# Patient Record
Sex: MALE | Race: BLACK OR AFRICAN AMERICAN | Employment: OTHER | URBAN - METROPOLITAN AREA
[De-identification: names, ages, dates, MRNs, and addresses within clinical notes are randomized per-mention and may not be internally consistent; named-entity substitution may affect disease eponyms.]

---

## 2017-05-04 ENCOUNTER — OFFICE VISIT (OUTPATIENT)
Dept: HEMATOLOGY | Age: 57
End: 2017-05-04

## 2017-05-04 ENCOUNTER — HOSPITAL ENCOUNTER (OUTPATIENT)
Dept: LAB | Age: 57
Discharge: HOME | End: 2017-05-04

## 2017-05-04 VITALS
WEIGHT: 193 LBS | HEIGHT: 74 IN | SYSTOLIC BLOOD PRESSURE: 127 MMHG | BODY MASS INDEX: 24.77 KG/M2 | DIASTOLIC BLOOD PRESSURE: 78 MMHG | HEART RATE: 66 BPM | OXYGEN SATURATION: 98 % | RESPIRATION RATE: 16 BRPM | TEMPERATURE: 97 F

## 2017-05-04 DIAGNOSIS — B18.2 CHRONIC HEPATITIS C WITHOUT HEPATIC COMA (HCC): Primary | ICD-10-CM

## 2017-05-04 DIAGNOSIS — B18.2 CHRONIC HEPATITIS C WITHOUT HEPATIC COMA (HCC): ICD-10-CM

## 2017-05-04 PROCEDURE — 99001 SPECIMEN HANDLING PT-LAB: CPT | Performed by: NURSE PRACTITIONER

## 2017-05-04 RX ORDER — LEDIPASVIR AND SOFOSBUVIR 90; 400 MG/1; MG/1
1 TABLET, FILM COATED ORAL DAILY
Qty: 28 TAB | Refills: 1 | Status: SHIPPED | OUTPATIENT
Start: 2017-05-04 | End: 2017-06-29

## 2017-05-04 NOTE — MR AVS SNAPSHOT
Visit Information Date & Time Provider Department Dept. Phone Encounter #  
 5/4/2017 11:00 AM Rachid Sin NP [facility] of [address] [place] [address] [id number] Follow-up Instructions Return in about 6 months (around 11/4/2017). Your Appointments 5/22/2017 10:30 AM  
Follow Up with Rachid Sin NP [facility] of [facility] Perez ([address]) Appt Note: follow up: [address] [facility] [place] [address] [place] [address] [facility] Upcoming Health Maintenance Date Due Pneumococcal 19-64 Medium Risk (1 of 1 - PPSV23) 8/8/1977 DTaP/Tdap/Td series (1 - Tdap) 8/8/1979 FOBT Q 1 YEAR AGE 50-75 8/8/2008 INFLUENZA AGE 9 TO ADULT 8/1/2017 Allergies as of 5/4/2017  Review Complete On: 5/4/2017 By: Giovanni Pearce No Known Allergies Current Immunizations  Never Reviewed No immunizations on file. Not reviewed this visit You Were Diagnosed With   
  
 Codes Comments Chronic hepatitis C without hepatic coma (HCC)    -  Primary ICD-10-CM: B18.2 ICD-9-CM: 070.54 Vitals BP Pulse Temp Resp Height(growth percentile) Weight(growth percentile) 127/78 (BP 1 Location: Left arm, BP Patient Position: Sitting) 66 97 °F (36.1 °C) (Tympanic) 16 6' 2\" (1.88 m) 193 lb (87.5 kg) SpO2 BMI Smoking Status 98% 24.78 kg/m2 Never Smoker Vitals History BMI and BSA Data Body Mass Index Body Surface Area 24.78 kg/m 2 2.14 m 2 Preferred Pharmacy Pharmacy Name Phone [facility] @ [address] [address] Celsa Ortiz [phone] Your Updated Medication List  
  
   
This list is accurate as of: 5/4/17 11:42 AM.  Always use your most recent med list. amLODIPine 10 mg tablet Commonly known as:  Pedro Hay Take  by mouth daily. benazepril 40 mg tablet Commonly known as:  LOTENSIN Take 40 mg by mouth daily. ledipasvir-sofosbuvir  mg Tab Commonly known as:  Ralph Molina Take 1 Tab by mouth daily for 56 days. Indications: CHRONIC HEPATITIS C - GENOTYPE 1, THE [facility] pt PROBIOTIC 4X 10-15 mg Tbec Generic drug:  B.infantis-B.ani-B.long-B.bifi Take  by mouth. Prescriptions Sent to Pharmacy Refills  
 ledipasvir-sofosbuvir (HARVONI)  mg tab 1 Sig: Take 1 Tab by mouth daily for 56 days. Indications: CHRONIC HEPATITIS C - GENOTYPE 1, THE [facility] pt  
 Class: Normal  
 Pharmacy: Lissette Daryb3 @ [address] [facility] #: [phone] Route: Oral  
  
Follow-up Instructions Return in about 6 months (around 11/4/2017). To-Do List   
 05/04/2017 Lab:  7-DRUG SCREEN, WHOLE BLD   
  
 05/04/2017 Lab:  CBC WITH AUTOMATED DIFF   
  
 05/04/2017 Lab:  ETHYL ALCOHOL   
  
 05/04/2017 Lab:  HCV, QT WITH GRAPH   
  
 05/04/2017 Lab:  HEPATIC FUNCTION PANEL   
  
 05/04/2017 Lab:  METABOLIC PANEL, BASIC Introducing [facility]! Armond Okeefe introduces [employer] patient portal. Now you can access parts of your medical record, email your doctor's office, and request medication refills online. 1. In your internet browser, go to [email or web address] 2. Click on the First Time User? Click Here link in the Sign In box. You will see the New Member Sign Up page. 3. Enter your [employer] Access Code exactly as it appears below. You will not need to use this code after youve completed the sign-up process. If you do not sign up before the expiration date, you must request a new code. · [employer] Access Code: [email or web address] Expires: 8/2/2017 11:42 AM 
 
4. Enter the last four digits of your Social Security Number (xxxx) and Date of Birth (mm/dd/yyyy) as indicated and click Submit. You will be taken to the next sign-up page. 5. Create a Affle ID. This will be your [employer] login ID and cannot be changed, so think of one that is secure and easy to remember. 6. Create a Affle password. You can change your password at any time. 7. Enter your Password Reset Question and Answer. This can be used at a later time if you forget your password. 8. Enter your e-mail address. You will receive e-mail notification when new information is available in [address] 9. Click Sign Up. You can now view and download portions of your medical record. 10. Click the Download Summary menu link to download a portable copy of your medical information. If you have questions, please visit the Frequently Asked Questions section of the [employer] website. Remember, [employer] is NOT to be used for urgent needs. For medical emergencies, dial 911. Now available from your iPhone and Android! Please provide this summary of care documentation to your next provider. Your primary care clinician is listed as Carolee Flores. If you have any questions after today's visit, please call [phone].

## 2017-05-04 NOTE — PROGRESS NOTES
2 [facility] Charles Islas MD, BRIGITTE Andrews PA-C Bernhard Reasons, MD, FACP, MD Myla Gomez NP Amber Finner, NP        at [facility] AT [address], [address] [address]     [phone]     FAX: [phone]    at [address], [address] [place] [address], [address]     [phone]     FAX: [phone]         Patient Care Team:  Hanny Baker MD as PCP - General      Problem List  Date Reviewed: 5/4/2017          Codes Class Noted    Chronic hepatitis C [facility]) ICD-10-CM: B18.2  ICD-9-CM: 070.54  1/5/2015        Hypertension ICD-10-CM: I10  ICD-9-CM: 401.9  1/5/2015              Karlene Weir returns to the [place] [address] today for education and management of chronic HCV. The patient is a 60 y.o. Black male who was noted to have abnormalities in liver chemistries and subsequently tested positive for chronic HCV in 2004. Risk factors for acquiring HCV are IV drug use in early 1980s. There was no history of acute incteric hepatitis at the time of these risk factors. Imaging of the liver was performed in 04/2015. This displayed a mildly coarsened/echogenic liver. No masses. A liver biopsy has not been performed. Fibrosure lab test suggests F3, bridging fibrosis. The patient has never received treatment for chronic HCV. The most recent laboratory studies indicate that the liver transaminases are elevated, alkaline phosphatase is normal, tests of hepatic synthetic and metabolic function are normal, and the platelet count is normal.      The patient has no complaints which can be attributed to liver disease. The patient completes all daily activities without any functional limitations.  The patient has not experienced fatigue, fevers, chills, shortness of breath, chest pain, pain in the right side over the liver, diffuse abdominal pain, nausea, vomiting, constipation, diarrhrea, dry eyes, dry mouth, arthralgias, myalgias, yellowing of the eyes or skin, itching, dark urine, problems concentrating, swelling of the abdomen, swelling of the lower extremities, hematemesis, or hematochezia. ALLERGIES  No Known Allergies    MEDICATIONS  Current Outpatient Prescriptions   Medication Sig    benazepril (LOTENSIN) 40 mg tablet Take 40 mg by mouth daily.  amLODIPine (NORVASC) 10 mg tablet Take  by mouth daily.  B.infantis-B.ani-B.long-B.bifi (PROBIOTIC 4X) 10-15 mg TbEC Take  by mouth. No current facility-administered medications for this visit. SYSTEM REVIEW NOT RELATED TO LIVER DISEASE OR REVIEWED ABOVE:  Constitution systems: Negative for fever, chills, weight gain, weight loss. Eyes: Negative for visual changes. ENT: Negative for sore throat, painful swallowing. Respiratory: Negative for cough, hemoptysis, SOB. Cardiology: Negative for chest pain, palpitations. GI:  Negative for constipation or diarrhea. : Negative for urinary frequency, dysuria, hematuria, nocturia. Skin: Negative for rash. Hematology: Negative for easy bruising, blood clots. Musculo-skelatal: Negative for back pain, muscle pain, weakness. Neurologic: Negative for headaches, dizziness, vertigo, memory problems not related to HE. Psychology: Negative for anxiety, depression. FAMILY HISTORY:  The father  of old age. The mother  of cancer. There is no family history of liver disease. SOCIAL HISTORY:  The patient is . The patient has 3 children, and 7 grandchildren. The patient has never used tobacco products. The patient consumes 0-2 alcoholic beverages per day. The patient currently works full time .       PHYSICAL EXAMINATION:  Visit Vitals    /78 (BP 1 Location: Left arm, BP Patient Position: Sitting)    Pulse 66    Temp 97 °F (36.1 °C) (Tympanic)    Resp 16    Ht 6' 2\" (1.88 m)    Wt 193 lb (87.5 kg)    SpO2 98%    BMI 24.78 kg/m2     General: No acute distress. Eyes: Sclera anicteric. ENT: No oral lesions. Thyroid normal.  Nodes: No adenopathy. Skin: No spider angiomata. No jaundice. No palmar erythema. Respiratory: Lungs clear to auscultation. Cardiovascular: Regular heart rate. No murmurs. No JVD. Abdomen: Soft non-tender. Liver size normal to percussion/palpation. Spleen not palpable. No obvious ascites. Extremities: No edema. No muscle wasting. No gross arthritic changes. Neurologic: Alert and oriented. Cranial nerves grossly intact. No asterixsis. LABORATORY STUDIES:    [facility] [address] [place] [address] 11/21/2016 7/25/2016   WBC 4.6 - 13.2 K/uL 5.9 5.2   ANC 1.8 - 8.0 K/UL 3.6 2.7   HGB 13.0 - 16.0 g/dL 14.4 15.1    - 420 K/uL 207 205   AST 15 - 37 U/L 85 (H) 125 (H)   ALT 16 - 61 U/L 126 (H) 128 (H)   Alk Phos 45 - 117 U/L 66 75   Bili, Total 0.2 - 1.0 MG/DL 0.4 0.4   Bili, Direct 0.0 - 0.2 MG/DL 0.2 <0.2   Albumin 3.4 - 5.0 g/dL 3.9 4.3   BUN 7.0 - 18 MG/DL 8 9   Creat 0.6 - 1.3 MG/DL 1.06 0.7   Na 136 - 145 mmol/L 140 138   K 3.5 - 5.5 mmol/L 3.6 3.4 (L)   Cl 100 - 108 mmol/L 98 (L) 98   CO2 21 - 32 mmol/L 35 (H) 27   Glucose 74 - 99 mg/dL 84 91     SEROLOGIES:  2/2014. Anti-HBsurface antigen negative, anti-HBcore negative, HCV RNA Log 6.1,m anti-HIV negative. Genotype 1B. LIVER HISTOLOGY:  07/2016. Fibrosure lab assessment of hepatic fibrosis. F3, bridging fibrosis with many septa. ENDOSCOPIC PROCEDURES:  Not available or performed    RADIOLOGY:  04/2015. Ultrasound of liver. Mildly coarsened/echogenic liver. No masses. OTHER TESTING:  Not available or performed    ASSESSMENT AND PLAN:  Chronic HCV of unclear severity.   The most recent laboratory studies indicate that the liver transaminases are elevated, alkaline phosphatase is normal, tests of hepatic synthetic and metabolic function are normal, and the platelet count is normal.  Based upon imaging and laboratory studies the patient does not appear to have advanced liver disease or cirrhosis. Will perform laboratory testing to monitor liver function and degree of liver injury. This will include hepatic panel, a CBC w/ diff, a BMP, drug and alcohol screening, and an HCV RNA.    HCV. He has genotype 1B. The patient has not previously been treated for HCV. He now has insurance. We discussed treatment options. One treatment regime is Harvoni, a combination pill of 400 mg sofosbuvir and 90 mg ledipasvir. The treatment regime is one tablet daily for 8 weeks. He would like to be treated with this regime. [facility] I have explained to him that I ordered the medications from a specialty pharmacy ([facility]) and the medications will be delivered to his home. He may begin taking the treatment medications as soon as they arrive. He is to call and make an appointment for treatment week #4 once he begins therapy. He voiced understanding of this plan. The patient was directed to continue all current medications at the current dosages. There are no contraindications for the patient to take any medications that are necessary for treatment of other medical issues. The patient was instructed not to start any PPI while on treatment. The patient was instructed not to take any antacids within 4 hours of taking the Harvoni. The patient verbalized understanding of these instructions. The patient was counseled regarding alcohol consumption. The need for vaccination against viral hepatitis A and B will be assessed with serologic and instituted as appropriate. All of the above issues were discussed with the patient. All questions were answered. The patient expressed a clear understanding of the above. [facility] [address] in 6 months.  He will make a treatment week 4 appointment when he begins HCV treatment.       Abigail Espinoza NP   [facility] of [address] [place] [address], [address] [facility], [facility]   [phone]

## 2017-05-08 LAB
ALBUMIN SERPL-MCNC: 4.3 G/DL (ref 3.5–5.5)
ALP SERPL-CCNC: 67 IU/L (ref 39–117)
ALT SERPL-CCNC: 84 IU/L (ref 0–44)
AMPHETAMINES BLD QL SCN: NEGATIVE NG/ML
AST SERPL-CCNC: 72 IU/L (ref 0–40)
BARBITURATES SERPLBLD QL: NEGATIVE UG/ML
BASOPHILS # BLD AUTO: 0 X10E3/UL (ref 0–0.2)
BASOPHILS NFR BLD AUTO: 0 %
BILIRUB DIRECT SERPL-MCNC: 0.17 MG/DL (ref 0–0.4)
BILIRUB SERPL-MCNC: 0.5 MG/DL (ref 0–1.2)
BUN SERPL-MCNC: 7 MG/DL (ref 6–24)
BUN/CREAT SERPL: 9 (ref 9–20)
CALCIUM SERPL-MCNC: 9.3 MG/DL (ref 8.7–10.2)
CANNABINOIDS BLD QL SCN: NEGATIVE NG/ML
CHLORIDE SERPL-SCNC: 97 MMOL/L (ref 96–106)
CO2 SERPL-SCNC: 27 MMOL/L (ref 18–29)
COCAINE+BZE SERPLBLD QL SCN: NEGATIVE NG/ML
CREAT SERPL-MCNC: 0.8 MG/DL (ref 0.76–1.27)
EOSINOPHIL # BLD AUTO: 0.3 X10E3/UL (ref 0–0.4)
EOSINOPHIL NFR BLD AUTO: 6 %
ERYTHROCYTE [DISTWIDTH] IN BLOOD BY AUTOMATED COUNT: 14 % (ref 12.3–15.4)
ETHANOL BLD GC-MCNC: NEGATIVE %
GLUCOSE SERPL-MCNC: 113 MG/DL (ref 65–99)
HCT VFR BLD AUTO: 43.1 % (ref 37.5–51)
HCV RNA SERPL NAA+PROBE-ACNC: NORMAL IU/ML
HCV RNA SERPL NAA+PROBE-LOG IU: 6.59 LOG10 IU/ML
HGB BLD-MCNC: 14.6 G/DL (ref 12.6–17.7)
IMM GRANULOCYTES # BLD: 0 X10E3/UL (ref 0–0.1)
IMM GRANULOCYTES NFR BLD: 0 %
LYMPHOCYTES # BLD AUTO: 1.4 X10E3/UL (ref 0.7–3.1)
LYMPHOCYTES NFR BLD AUTO: 29 %
MCH RBC QN AUTO: 32.4 PG (ref 26.6–33)
MCHC RBC AUTO-ENTMCNC: 33.9 G/DL (ref 31.5–35.7)
MCV RBC AUTO: 96 FL (ref 79–97)
MONOCYTES # BLD AUTO: 0.3 X10E3/UL (ref 0.1–0.9)
MONOCYTES NFR BLD AUTO: 6 %
NEUTROPHILS # BLD AUTO: 2.8 X10E3/UL (ref 1.4–7)
NEUTROPHILS NFR BLD AUTO: 59 %
OPIATES BLD QL SCN: NEGATIVE NG/ML
OXYCODONES, 738315: NEGATIVE NG/ML
PCP BLD QL SCN: NEGATIVE NG/ML
PLATELET # BLD AUTO: 197 X10E3/UL (ref 150–379)
POTASSIUM SERPL-SCNC: 3.6 MMOL/L (ref 3.5–5.2)
PROT SERPL-MCNC: 7.3 G/DL (ref 6–8.5)
RBC # BLD AUTO: 4.51 X10E6/UL (ref 4.14–5.8)
SODIUM SERPL-SCNC: 140 MMOL/L (ref 134–144)
TEST INFORMATION: NORMAL
WBC # BLD AUTO: 4.8 X10E3/UL (ref 3.4–10.8)

## 2020-11-16 ENCOUNTER — TELEPHONE (OUTPATIENT)
Dept: FAMILY MEDICINE CLINIC | Age: 60
End: 2020-11-16

## 2020-11-16 NOTE — TELEPHONE ENCOUNTER
----- Message from Main Dupont sent at 11/16/2020 11:28 AM EST -----  Regarding: Mariela/Telephone  General Message/Vendor Calls    Caller's first and last name: Robbie Christine ( wife)      Reason for call: needs to be referred to get a colonoscopy       Callback required yes/no and why: yes      Best contact number(s): [phone]      Details to clarify the request: Pt has medicaid now and needs to know of some doctors he can go to to get his colonoscopy.  The doctor he was referred previously doesn't not accept his insurance       Main Dupont